# Patient Record
(demographics unavailable — no encounter records)

---

## 2020-06-08 NOTE — RADIOLOGY REPORT (SQ)
EXAM DESCRIPTION:  CT CHEST WITHOUT



IMAGES COMPLETED DATE/TIME:  6/8/2020 12:56 pm



REASON FOR STUDY:  C64.1 MALIGNANT NEOPLASM OF RIGHT KIDNEY, EXCEPT RENAL PELVIS C64.1  MALIGNANT DIMPLE
PLASM OF RIGHT KIDNEY, EXCEPT RENAL PELVI



COMPARISON:  PET from 3/3/2020 and CT of the chest from 7/6/2019.



TECHNIQUE:  CT scan performed of the chest without intravenous contrast.  Images reviewed with lung, 
soft tissue and bone windows.  Reconstructed coronal and sagittal MPR images reviewed.  All images st
ored on PACS.

All CT scanners at this facility use dose modulation, iterative reconstruction, and/or weight based d
osing when appropriate to reduce radiation dose to as low as reasonably achievable (ALARA).

[facility]: Dose Right  [facility]: CareDose    [facility]: Dose Right    [facility]: Teradose 4D    [facility]: Smart Technologies



RADIATION DOSE:  CT Rad equipment meets quality standard of care and radiation dose reduction techniq
ues were employed. CTDIvol: 12.4 mGy. DLP: 551 mGy-cm.



LIMITATIONS:  No technical limitations.



FINDINGS:  LUNGS AND PLEURA: The trachea and main bronchi are patent.  The peribronchial opacities in
 the right lower lobe that are associated with mild bronchiectasis and contain fiducial markers are u
nchanged.  The peribronchial opacities and ground-glass opacities in the right upper lobe associated 
with mild bronchiectasis and the branching nodular opacities in the lingula are also unchanged.  The 
ground-glass nodular opacity in the apical segment of the left upper lobe described on the prior PET 
is no longer present.  There is no acute consolidation, ground-glass opacification or pleural effusio
n.

LUNG NODULES:  There is a new subpleural nodule in the apical segment of the right upper lobe (image 
26 of series 4) that measures 26 x 4 mm.  The 11 mm nodule in the apical segment of the right upper l
obe (image 19 of series 4), the 5 mm nodule in the right upper lobe (image 29 of series 4), the 3 mm 
nodule in the left upper lobe (image 32 of series 4), the perifissural nodule in the left lower lobe 
(image 71 of series 4), the 3 mm nodule in the left lower lobe (image 77 of series 4), and the 3 mm s
ubpleural nodule in the right lower lobe (image 100 of series 4) are unchanged.

HILAR AND MEDIASTINAL STRUCTURES: Evaluation of the cameron for adenopathy is limited due to the absence
 of intravenous contrast.  The right lower paratracheal lymph node on image 26 of series 2 that measu
res 6 mm in short axis diameter has decreased in size (it previously measured 12 mm in short axis kenney
meter).  There is no mediastinal mass.

HEART AND VASCULAR STRUCTURES: Unchanged pericardial effusion and moderate atherosclerotic calcificat
ion of the coronary arteries.

UPPER ABDOMEN: Stable hypodense lesion in the hepatic dome that measures 18 x 16 mm.  The right kidne
y is surgically absent.

THYROID AND OTHER SOFT TISSUES: No mass or adenopathy.

BONES: No fracture or osseous lesion.

HARDWARE: None in the chest.

OTHER: No other findings.



IMPRESSION:  1. New subpleural nodule in the apical segment of the right upper lobe (image 26 of seri
es 4) that measures 26 x 4 mm).  There is no associated erosion or sclerosis of the adjacent rib.

2. The previously described ground-glass nodular opacity in the apical segment of the left upper lobe
 is no longer present.

3.  Stable peribronchial and ground-glass opacities in the right upper and right lower lobes and bran
stephanie nodular opacities in the lingula as detailed above.

4.  6 mm right lower paratracheal lymph node that has decreased in size from the prior PET.



TECHNICAL DOCUMENTATION:  JOB ID:  [id number]

Quality ID # [id number]: Final reports with documentation of one or more dose reduction techniques (e.g., Au
tomated exposure control, adjustment of the mA and/or kV according to patient size, use of iterative 
reconstruction technique)

 2011 [email or web address]- All Rights Reserved



Reading location - IP/workstation name: [email or web address]

## 2020-06-22 NOTE — OPERATIVE REPORT
Operative Report


DATE OF SURGERY: 06/22/20


PREOPERATIVE DIAGNOSIS: Metastatic renal cell carcinoma


POSTOPERATIVE DIAGNOSIS: Same


OPERATION: 1.  Focused ultrasound of the right neck.  2.  Ultrasound directed 

insertion of right internal jugular vein catheter with port in right subclavian 

position, single-chamber.  3.  Interpretation of intraoperative fluoroscopy


SURGEON: INGRID OLIVARES


ANESTHESIA: LMAC


TISSUE REMOVED OR ALTERED: None


COMPLICATIONS: 





None


ESTIMATED BLOOD LOSS: Minimal


INTRAOPERATIVE FINDINGS: See below


PROCEDURE: 





Patient was taken to the preop holding area to the main operating room where 

LMAC anesthesia was induced.  Arms were tucked at the side, hair from the neck 

and chest clipped.  The neck and chest wall were prepped and draped in sterile 

fashion





Surgical plan surgical timeout were conducted.





Focused ultrasound of the right neck revealed a patent, compressible right 

internal jugular vein.  The skin overlying the vein was anesthetized 1% plain 

lidocaine.  A micro needle and wire were threaded into the right internal 

jugular vein without difficulty.





A suitable site for placement of the port was chosen in the right subclavian 

position.  The skin was anesthetized with 1% plain lidocaine.  A 3 cm incision 

was made with a #15 blade, and a port pocket developed large enough to 

accommodate a dual-chamber port.  The catheter was then trimmed to the 

appropriate length, 26 cm, and threaded between the 2 incisions.  The catheter 

was attached to the port and retaining ring secured into position.  The port was

tucked of the right subclavian pocket.





The microneedle was switched over to a conventional 0.030 guidewire with the 

micro-introducer sheath.  We then threaded the 9.5 Croatian dilator and sheath 

over the wire, remove the wire and dilator, and threaded the free catheter 

fragment into the strip away sheath.  The strip away sheath was removed leaving 

the catheter in good position with no evidence of kinking is demonstrated by 

fluoroscopy, the tip of the catheter in the superior vena cava.  We aspirated 

and flushed the port with heparinized saline and function satisfactorily.  

Wounds closed with 3-0 Vicryl benzoin and Steri-Strips.  Patient tolerated the 

procedure well, taken to the recovery room in stable condition.

## 2020-06-22 NOTE — RADIOLOGY REPORT (SQ)
EXAM DESCRIPTION:  FLUORO/CV PLACEMENT



IMAGES COMPLETED DATE/TIME:  6/22/2020 9:55 am



REASON FOR STUDY:  PORTACATH PLCMT RIGHT SIDE ASSISTED WITH FLUORO IN OR C64.1  MALIGNANT NEOPLASM OF
 RIGHT KIDNEY, EXCEPT RENAL PELVI



COMPARISON:  None.



FLUOROSCOPY TIME:  0.1 minutes

Spot images saved to PACS.



TECHNIQUE:  Intra-operative images acquired during surgical procedure to evaluate progress.

NUMBER OF IMAGES: 2



LIMITATIONS:  None.



FINDINGS:  Fluoroscopy was provided for intraoperative procedure.  Please refer to the operative repo
rt for further discussion.



IMPRESSION:  IMAGE(S) OBTAINED DURING PROCEDURE.



COMMENT:  Quality :  Final reports for procedures using fluoroscopy that document radiation exp
osure indices, or exposure time and number of fluorographic images (if radiation exposure indices are
 not available)

Please consult full operative report of the attending physician for description of the procedure.



TECHNICAL DOCUMENTATION:  JOB ID:  [id number]

 2011 [employer]- All Rights Reserved



Reading location - IP/workstation name: [email or web address]

## 2020-06-22 NOTE — DISCHARGE SUMMARY
Discharge Summary (SDC)





- Discharge


Final Diagnosis: 





Metastatic renal cell carcinoma


Date of Surgery: 06/22/20


Discharge Date: 06/22/20


Condition: Good


Treatment or Instructions: 


May use port; allow Steri-Strips to fall off; take Tylenol or Motrin as needed 

pain; follow-up with [facility] in 1 to 2 weeks.


Referrals: 


ASHLEY MARINO MD [Primary Care Provider] - 


Discharge Diet: As Tolerated


Discharge Activity: Activity As Tolerated


Home Care Assistance: None Needed


Report the Following to Your Physician Immediately: Shortness of Breath, 

Increase in Pain, Fever over 101 Degrees